# Patient Record
Sex: FEMALE | Race: WHITE | Employment: FULL TIME | URBAN - METROPOLITAN AREA
[De-identification: names, ages, dates, MRNs, and addresses within clinical notes are randomized per-mention and may not be internally consistent; named-entity substitution may affect disease eponyms.]

---

## 2023-08-01 SDOH — HEALTH STABILITY: PHYSICAL HEALTH: ON AVERAGE, HOW MANY MINUTES DO YOU ENGAGE IN EXERCISE AT THIS LEVEL?: 20 MIN

## 2023-08-01 SDOH — HEALTH STABILITY: PHYSICAL HEALTH: ON AVERAGE, HOW MANY DAYS PER WEEK DO YOU ENGAGE IN MODERATE TO STRENUOUS EXERCISE (LIKE A BRISK WALK)?: 7 DAYS

## 2023-08-01 ASSESSMENT — SOCIAL DETERMINANTS OF HEALTH (SDOH)

## 2023-08-04 ENCOUNTER — OFFICE VISIT (OUTPATIENT)
Age: 33
End: 2023-08-04
Payer: COMMERCIAL

## 2023-08-04 VITALS
SYSTOLIC BLOOD PRESSURE: 200 MMHG | HEART RATE: 116 BPM | WEIGHT: 188 LBS | OXYGEN SATURATION: 100 % | BODY MASS INDEX: 34.6 KG/M2 | DIASTOLIC BLOOD PRESSURE: 110 MMHG | TEMPERATURE: 99.3 F | HEIGHT: 62 IN | RESPIRATION RATE: 16 BRPM

## 2023-08-04 DIAGNOSIS — Z11.59 ENCOUNTER FOR HEPATITIS C SCREENING TEST FOR LOW RISK PATIENT: ICD-10-CM

## 2023-08-04 DIAGNOSIS — F41.1 GAD (GENERALIZED ANXIETY DISORDER): ICD-10-CM

## 2023-08-04 DIAGNOSIS — Z00.00 ROUTINE GENERAL MEDICAL EXAMINATION AT A HEALTH CARE FACILITY: Primary | ICD-10-CM

## 2023-08-04 DIAGNOSIS — R03.0 WHITE COAT SYNDROME WITHOUT DIAGNOSIS OF HYPERTENSION: ICD-10-CM

## 2023-08-04 DIAGNOSIS — Z13.1 SCREENING FOR DIABETES MELLITUS: ICD-10-CM

## 2023-08-04 DIAGNOSIS — Z11.3 SCREENING EXAMINATION FOR STD (SEXUALLY TRANSMITTED DISEASE): ICD-10-CM

## 2023-08-04 DIAGNOSIS — Z13.220 SCREENING, LIPID: ICD-10-CM

## 2023-08-04 PROCEDURE — 99385 PREV VISIT NEW AGE 18-39: CPT | Performed by: NURSE PRACTITIONER

## 2023-08-04 SDOH — ECONOMIC STABILITY: INCOME INSECURITY: HOW HARD IS IT FOR YOU TO PAY FOR THE VERY BASICS LIKE FOOD, HOUSING, MEDICAL CARE, AND HEATING?: NOT VERY HARD

## 2023-08-04 SDOH — ECONOMIC STABILITY: FOOD INSECURITY: WITHIN THE PAST 12 MONTHS, YOU WORRIED THAT YOUR FOOD WOULD RUN OUT BEFORE YOU GOT MONEY TO BUY MORE.: NEVER TRUE

## 2023-08-04 SDOH — ECONOMIC STABILITY: HOUSING INSECURITY
IN THE LAST 12 MONTHS, WAS THERE A TIME WHEN YOU DID NOT HAVE A STEADY PLACE TO SLEEP OR SLEPT IN A SHELTER (INCLUDING NOW)?: NO

## 2023-08-04 SDOH — ECONOMIC STABILITY: FOOD INSECURITY: WITHIN THE PAST 12 MONTHS, THE FOOD YOU BOUGHT JUST DIDN'T LAST AND YOU DIDN'T HAVE MONEY TO GET MORE.: NEVER TRUE

## 2023-08-04 ASSESSMENT — ENCOUNTER SYMPTOMS
ABDOMINAL PAIN: 0
CONSTIPATION: 0
SHORTNESS OF BREATH: 0
DIARRHEA: 0
COUGH: 0
BLOOD IN STOOL: 0
EYE PAIN: 0

## 2023-08-04 ASSESSMENT — PATIENT HEALTH QUESTIONNAIRE - PHQ9
SUM OF ALL RESPONSES TO PHQ QUESTIONS 1-9: 0
7. TROUBLE CONCENTRATING ON THINGS, SUCH AS READING THE NEWSPAPER OR WATCHING TELEVISION: 0
8. MOVING OR SPEAKING SO SLOWLY THAT OTHER PEOPLE COULD HAVE NOTICED. OR THE OPPOSITE, BEING SO FIGETY OR RESTLESS THAT YOU HAVE BEEN MOVING AROUND A LOT MORE THAN USUAL: 0
1. LITTLE INTEREST OR PLEASURE IN DOING THINGS: 0
SUM OF ALL RESPONSES TO PHQ QUESTIONS 1-9: 0
2. FEELING DOWN, DEPRESSED OR HOPELESS: 0
3. TROUBLE FALLING OR STAYING ASLEEP: 0
SUM OF ALL RESPONSES TO PHQ QUESTIONS 1-9: 0
10. IF YOU CHECKED OFF ANY PROBLEMS, HOW DIFFICULT HAVE THESE PROBLEMS MADE IT FOR YOU TO DO YOUR WORK, TAKE CARE OF THINGS AT HOME, OR GET ALONG WITH OTHER PEOPLE: 0
SUM OF ALL RESPONSES TO PHQ QUESTIONS 1-9: 0
SUM OF ALL RESPONSES TO PHQ9 QUESTIONS 1 & 2: 0
4. FEELING TIRED OR HAVING LITTLE ENERGY: 0
9. THOUGHTS THAT YOU WOULD BE BETTER OFF DEAD, OR OF HURTING YOURSELF: 0
5. POOR APPETITE OR OVEREATING: 0
6. FEELING BAD ABOUT YOURSELF - OR THAT YOU ARE A FAILURE OR HAVE LET YOURSELF OR YOUR FAMILY DOWN: 0

## 2023-08-04 NOTE — PROGRESS NOTES
Chief Complaint   Patient presents with    New Patient       1. Have you been to the ER, urgent care clinic since your last visit? Hospitalized since your last visit?       no    2. Have you seen or consulted any other health care providers outside of the [address] [place] [address] since your last visit? Include any pap smears or colon screening. no    3. For patients over 45: Has the patient had a colonoscopy? N/A     If the patient is female:    4. For patients over 40: Has the patient had a mammogram? N/A    5. For patients over 21: Has the patient had a pap smear? No      No flowsheet data found. Health Maintenance Due   Topic Date Due    COVID-19 Vaccine (1) Never done    Varicella vaccine (1 of 2 - 2-dose childhood series) Never done    Depression Screen  Never done    HIV screen  Never done    Hepatitis C screen  Never done    DTaP/Tdap/Td vaccine (1 - Tdap) Never done    Cervical cancer screen  05/10/2021    Flu vaccine (1) Never done       PHQ-9  8/4/2023   Little interest or pleasure in doing things 0   Feeling down, depressed, or hopeless 0   Trouble falling or staying asleep, or sleeping too much 0   Feeling tired or having little energy 0   Poor appetite or overeating 0   Feeling bad about yourself - or that you are a failure or have let yourself or your family down 0   Trouble concentrating on things, such as reading the newspaper or watching television 0   Moving or speaking so slowly that other people could have noticed.  Or the opposite - being so fidgety or restless that you have been moving around a lot more than usual 0   Thoughts that you would be better off dead, or of hurting yourself in some way 0   PHQ-2 Score 0   PHQ-9 Total Score 0   If you checked off any problems, how difficult have these problems made it for you to do your work, take care of things at home, or get along with other people? 0

## 2023-08-04 NOTE — PROGRESS NOTES
HPI:   Jaquan Borjas is a 34 y.o. female who presents to [facility]. She is returning to care after 10 years. She has significant anxiety in regards to healthcare. She has a history of anxiety. Family history of anxiety in mother and sister. GAD7=13    She is a triplet. She has a brother and sister. She had a normal pap smear in 2016. She is currently menstruating. She is sexually active with her long term boyfriend. They live together. She uses condoms for contraception. She does not wish to have children. She works for a [employer]. No current outpatient medications on file. No current facility-administered medications for this visit. No Known Allergies   Patient Active Problem List   Diagnosis    Female hirsutism    QUINN (generalized anxiety disorder)    White coat syndrome without diagnosis of hypertension     History reviewed. No pertinent past medical history. History reviewed. No pertinent surgical history. Patient's last menstrual period was 08/03/2023 (exact date).    Family History   Problem Relation Age of Onset    Anxiety Disorder Mother     Hypertension Mother     Anxiety Disorder Sister     Hypertension Maternal Grandmother     Hypertension Paternal Grandfather     Diabetes Paternal Grandfather       Social History     Socioeconomic History    Marital status: Single     Spouse name: Not on file    Number of children: Not on file    Years of education: Not on file    Highest education level: Not on file   Occupational History    Not on file   Tobacco Use    Smoking status: Never    Smokeless tobacco: Never   Substance and Sexual Activity    Alcohol use: Yes     Comment: 6 drinks a week    Drug use: Never    Sexual activity: Yes     Partners: Male     Birth control/protection: Condom     Comment: In a relationship   Other Topics Concern    Not on file   Social History Narrative    Not on file     Social Determinants of Health     Financial

## 2023-08-05 LAB
ALBUMIN SERPL-MCNC: 5 G/DL (ref 3.9–4.9)
ALBUMIN/CREAT UR: 75 MG/G CREAT (ref 0–29)
ALBUMIN/GLOB SERPL: 1.7 {RATIO} (ref 1.2–2.2)
ALP SERPL-CCNC: 85 IU/L (ref 44–121)
ALT SERPL-CCNC: 15 IU/L (ref 0–32)
AST SERPL-CCNC: 15 IU/L (ref 0–40)
BASOPHILS # BLD AUTO: 0.1 X10E3/UL (ref 0–0.2)
BASOPHILS NFR BLD AUTO: 1 %
BILIRUB SERPL-MCNC: 0.4 MG/DL (ref 0–1.2)
BUN SERPL-MCNC: 12 MG/DL (ref 6–20)
BUN/CREAT SERPL: 13 (ref 9–23)
CALCIUM SERPL-MCNC: 10 MG/DL (ref 8.7–10.2)
CHLORIDE SERPL-SCNC: 100 MMOL/L (ref 96–106)
CHOLEST SERPL-MCNC: 235 MG/DL (ref 100–199)
CO2 SERPL-SCNC: 21 MMOL/L (ref 20–29)
CREAT SERPL-MCNC: 0.94 MG/DL (ref 0.57–1)
CREAT UR-MCNC: 106.1 MG/DL
EGFRCR SERPLBLD CKD-EPI 2021: 82 ML/MIN/1.73
EOSINOPHIL # BLD AUTO: 0.2 X10E3/UL (ref 0–0.4)
EOSINOPHIL NFR BLD AUTO: 2 %
ERYTHROCYTE [DISTWIDTH] IN BLOOD BY AUTOMATED COUNT: 13 % (ref 11.7–15.4)
GLOBULIN SER CALC-MCNC: 2.9 G/DL (ref 1.5–4.5)
GLUCOSE SERPL-MCNC: 93 MG/DL (ref 70–99)
HBA1C MFR BLD: 5.5 % (ref 4.8–5.6)
HCT VFR BLD AUTO: 51 % (ref 34–46.6)
HCV AB SERPL QL IA: NORMAL
HCV IGG SERPL QL IA: NON REACTIVE
HDLC SERPL-MCNC: 54 MG/DL
HGB BLD-MCNC: 17.4 G/DL (ref 11.1–15.9)
HIV 1+2 AB+HIV1 P24 AG SERPL QL IA: NON REACTIVE
IMM GRANULOCYTES # BLD AUTO: 0 X10E3/UL (ref 0–0.1)
IMM GRANULOCYTES NFR BLD AUTO: 0 %
IMP & REVIEW OF LAB RESULTS: NORMAL
LDLC SERPL CALC-MCNC: 146 MG/DL (ref 0–99)
LYMPHOCYTES # BLD AUTO: 2.6 X10E3/UL (ref 0.7–3.1)
LYMPHOCYTES NFR BLD AUTO: 25 %
MCH RBC QN AUTO: 30 PG (ref 26.6–33)
MCHC RBC AUTO-ENTMCNC: 34.1 G/DL (ref 31.5–35.7)
MCV RBC AUTO: 88 FL (ref 79–97)
MICROALBUMIN UR-MCNC: 79.9 UG/ML
MONOCYTES # BLD AUTO: 0.4 X10E3/UL (ref 0.1–0.9)
MONOCYTES NFR BLD AUTO: 4 %
NEUTROPHILS # BLD AUTO: 7.1 X10E3/UL (ref 1.4–7)
NEUTROPHILS NFR BLD AUTO: 68 %
PLATELET # BLD AUTO: 317 X10E3/UL (ref 150–450)
POTASSIUM SERPL-SCNC: 4.2 MMOL/L (ref 3.5–5.2)
PROT SERPL-MCNC: 7.9 G/DL (ref 6–8.5)
RBC # BLD AUTO: 5.8 X10E6/UL (ref 3.77–5.28)
SODIUM SERPL-SCNC: 140 MMOL/L (ref 134–144)
TRIGL SERPL-MCNC: 193 MG/DL (ref 0–149)
TSH SERPL DL<=0.005 MIU/L-ACNC: 1.75 UIU/ML (ref 0.45–4.5)
VLDLC SERPL CALC-MCNC: 35 MG/DL (ref 5–40)
WBC # BLD AUTO: 10.3 X10E3/UL (ref 3.4–10.8)

## 2023-09-25 ENCOUNTER — OFFICE VISIT (OUTPATIENT)
Age: 33
End: 2023-09-25
Payer: COMMERCIAL

## 2023-09-25 DIAGNOSIS — F41.1 GAD (GENERALIZED ANXIETY DISORDER): Primary | ICD-10-CM

## 2023-09-25 PROCEDURE — 90791 PSYCH DIAGNOSTIC EVALUATION: CPT | Performed by: SOCIAL WORKER

## 2023-09-25 NOTE — PROGRESS NOTES
In Office-Initial Evaluation    Time Start:8:55 am  Time End:9:56 am    DX:    Diagnosis Orders   1. QUINN (generalized anxiety disorder)                 Met with Ms. Kavon Moss 9/25/2023 for our first appointment. This patient was referred by her NP due to anxious symptoms. She reports she has had anxiety for as long as she can remember. One of her side effects from her anxiety is she admits to scratching her arms-not really aware when she does it but often has scratches up and down her arms. Her partner is also an anxious person so he is not much of a support for her. Ms. Kavon Moss was in counseling when she was 14years old for anxiety but did not get any better from that assistance. Additionally, Ms. Kavon Moss reports both generalized and social anxiety. She does not like going into a store and asking for help, doesn't like new or different surroundings, avoids crowds and avoids using the phone as much as possible. Ms. Kavon Moss grew up in [address] [place] [address] and is a triplet. She has a sister and brother-both residing in [address] [place] [address]. Her mother and father  when she was young but still \"get along okay. \"  Her parents are in their 63's and her mother and one of the sisters live together in [facility] and her father resides in [place]. She communicates with all her family but they do not seem to be a particularly close family. The sister has alcohol issues and has difficulty holding down a job, per this patient. Ms. Kavon Moss and her brother both attended college. This patient graduated from [employer]. She has worked in a [employer] [place], as an  for the past 5 years. The patient has been with her boyfriend for the past 10 years. They live together. He has a part time job and does not seem to contribute much financially to the relationship. Ms. Kavon Moss may benefit from medication due to the severity of her anxiety.   She reports over thinking, difficulty sleeping and only really relaxes

## 2023-10-13 ENCOUNTER — OFFICE VISIT (OUTPATIENT)
Age: 33
End: 2023-10-13
Payer: COMMERCIAL

## 2023-10-13 VITALS
HEART RATE: 91 BPM | BODY MASS INDEX: 35.41 KG/M2 | OXYGEN SATURATION: 100 % | SYSTOLIC BLOOD PRESSURE: 194 MMHG | DIASTOLIC BLOOD PRESSURE: 100 MMHG | HEIGHT: 62 IN | RESPIRATION RATE: 16 BRPM | TEMPERATURE: 99.5 F | WEIGHT: 192.4 LBS

## 2023-10-13 DIAGNOSIS — Z12.4 ENCOUNTER FOR PAPANICOLAOU SMEAR OF CERVIX: ICD-10-CM

## 2023-10-13 DIAGNOSIS — F41.1 GAD (GENERALIZED ANXIETY DISORDER): ICD-10-CM

## 2023-10-13 DIAGNOSIS — I10 ESSENTIAL HYPERTENSION: Primary | ICD-10-CM

## 2023-10-13 PROCEDURE — 3077F SYST BP >= 140 MM HG: CPT | Performed by: NURSE PRACTITIONER

## 2023-10-13 PROCEDURE — 99214 OFFICE O/P EST MOD 30 MIN: CPT | Performed by: NURSE PRACTITIONER

## 2023-10-13 PROCEDURE — 3080F DIAST BP >= 90 MM HG: CPT | Performed by: NURSE PRACTITIONER

## 2023-10-13 RX ORDER — ESCITALOPRAM OXALATE 5 MG/1
5 TABLET ORAL DAILY
Qty: 30 TABLET | Refills: 3 | Status: SHIPPED | OUTPATIENT
Start: 2023-10-13

## 2023-10-13 RX ORDER — LISINOPRIL 2.5 MG/1
2.5 TABLET ORAL DAILY
Qty: 30 TABLET | Refills: 3 | Status: SHIPPED | OUTPATIENT
Start: 2023-10-13

## 2023-10-13 ASSESSMENT — ENCOUNTER SYMPTOMS: SHORTNESS OF BREATH: 0

## 2023-10-13 NOTE — PROGRESS NOTES
Assessment/Plan:     1. Essential hypertension  -     lisinopril (PRINIVIL;ZESTRIL) 2.5 MG tablet; Take 1 tablet by mouth daily, Disp-30 tablet, R-3Normal  Uncontrolled. Initiate treatment as above. Follow up in 4 weeks. 2. QUINN (generalized anxiety disorder)  -     escitalopram (LEXAPRO) 5 MG tablet; Take 1 tablet by mouth daily, Disp-30 tablet, R-3Normal  Uncontrolled. Initiate treatment as above. Follow up in 4 weeks. Continue with counseling services. 3. Encounter for Papanicolaou smear of cervix  -     PAP IG, Aptima HPV and rfx 16/18,45 ([id number]); Future       Return in about 4 weeks (around 11/10/2023) for Follow Up. Discussed expected course/resolution/complications of diagnosis in detail with patient. Medication risks/benefits/costs/interactions/alternatives discussed with patient. Pt was given after visit summary which includes diagnoses, current medications & vitals. Pt expressed understanding with the diagnosis and plan          Subjective:      Rome Alex is a 34 y.o. female who presents for had concerns including Gynecologic Exam.     Home pressures have been still high in the 170s/120s. She is asymptomatic. She is overdue for pap smear. Longstanding history of anxiety. Seeing counselor now who suggested medication management and she is interested in this. Patient Active Problem List   Diagnosis    Female hirsutism    QUINN (generalized anxiety disorder)    White coat syndrome without diagnosis of hypertension       Current Outpatient Medications   Medication Sig Dispense Refill    lisinopril (PRINIVIL;ZESTRIL) 2.5 MG tablet Take 1 tablet by mouth daily 30 tablet 3    escitalopram (LEXAPRO) 5 MG tablet Take 1 tablet by mouth daily 30 tablet 3     No current facility-administered medications for this visit. No Known Allergies    ROS:   Review of Systems   Constitutional:  Negative for fatigue. Respiratory:  Negative for shortness of breath.     Cardiovascular:

## 2023-10-18 ENCOUNTER — OFFICE VISIT (OUTPATIENT)
Age: 33
End: 2023-10-18
Payer: COMMERCIAL

## 2023-10-18 DIAGNOSIS — F41.1 GAD (GENERALIZED ANXIETY DISORDER): Primary | ICD-10-CM

## 2023-10-18 PROCEDURE — 90834 PSYTX W PT 45 MINUTES: CPT | Performed by: SOCIAL WORKER

## 2023-10-18 NOTE — PROGRESS NOTES
In office -Follow Up    Time Start:3:00 pm  Time End:3:47 pm    DX:    Diagnosis Orders   1. QUINN (generalized anxiety disorder)             Met with Ms. Billy Vazquez today for our follow up appt. This patient reports she has started taking Lexapro, 5 mg. And the only side effect she's experienced is feeling a little more tired. She just started this medication last week and will see her NP for follow up on Nov. 13 for further evaluation. Ms. Billy Vazquez reported she has been \"working on her homework\" which was to practice eye contact with people and practice talking to them. She has asked several grocery store assistants where foods are in the store. She has not been able to make any cold calls on the phone yet, stating she hasn't gotten up enough courage yet for that task. The patient continues to be nervous during our appts. She wrings her hands, talks rapidly and feels awkward initiating the conversation. She was given another homework assignment for our next appt which is to list areas in her life that are effected by her anxiety and we will discuss ways to come up with coping skills. No acute symptoms reported. We did set a follow-up appointment with this clinician.       Follow-up appt date (if applicable) is:  Novemeber 13 @ 11 in office (after her appt with her NP)    Cora Castillo LCSW

## 2023-10-20 LAB
CYTOLOGIST CVX/VAG CYTO: NORMAL
CYTOLOGY CVX/VAG DOC CYTO: NORMAL
CYTOLOGY CVX/VAG DOC THIN PREP: NORMAL
DX ICD CODE: NORMAL
HPV GENOTYPE REFLEX: NORMAL
HPV I/H RISK 4 DNA CVX QL PROBE+SIG AMP: NEGATIVE
Lab: NORMAL
OTHER STN SPEC: NORMAL
STAT OF ADQ CVX/VAG CYTO-IMP: NORMAL

## 2023-11-13 ENCOUNTER — OFFICE VISIT (OUTPATIENT)
Age: 33
End: 2023-11-13
Payer: COMMERCIAL

## 2023-11-13 VITALS
DIASTOLIC BLOOD PRESSURE: 106 MMHG | HEART RATE: 88 BPM | BODY MASS INDEX: 35.22 KG/M2 | WEIGHT: 191.4 LBS | OXYGEN SATURATION: 100 % | RESPIRATION RATE: 16 BRPM | HEIGHT: 62 IN | SYSTOLIC BLOOD PRESSURE: 171 MMHG | TEMPERATURE: 99.2 F

## 2023-11-13 DIAGNOSIS — I10 ESSENTIAL HYPERTENSION: Primary | ICD-10-CM

## 2023-11-13 DIAGNOSIS — F41.1 GAD (GENERALIZED ANXIETY DISORDER): ICD-10-CM

## 2023-11-13 DIAGNOSIS — L30.9 DERMATITIS: ICD-10-CM

## 2023-11-13 DIAGNOSIS — R79.89 ABNORMAL CBC: ICD-10-CM

## 2023-11-13 DIAGNOSIS — F41.1 GAD (GENERALIZED ANXIETY DISORDER): Primary | ICD-10-CM

## 2023-11-13 PROBLEM — R03.0 WHITE COAT SYNDROME WITHOUT DIAGNOSIS OF HYPERTENSION: Status: RESOLVED | Noted: 2023-08-04 | Resolved: 2023-11-13

## 2023-11-13 PROCEDURE — 3077F SYST BP >= 140 MM HG: CPT | Performed by: NURSE PRACTITIONER

## 2023-11-13 PROCEDURE — 99214 OFFICE O/P EST MOD 30 MIN: CPT | Performed by: NURSE PRACTITIONER

## 2023-11-13 PROCEDURE — 3080F DIAST BP >= 90 MM HG: CPT | Performed by: NURSE PRACTITIONER

## 2023-11-13 PROCEDURE — 90834 PSYTX W PT 45 MINUTES: CPT | Performed by: SOCIAL WORKER

## 2023-11-13 RX ORDER — LISINOPRIL 5 MG/1
5 TABLET ORAL DAILY
Qty: 30 TABLET | Refills: 3 | Status: SHIPPED | OUTPATIENT
Start: 2023-11-13

## 2023-11-13 RX ORDER — ESCITALOPRAM OXALATE 10 MG/1
10 TABLET ORAL DAILY
Qty: 30 TABLET | Refills: 3 | Status: SHIPPED | OUTPATIENT
Start: 2023-11-13

## 2023-11-13 ASSESSMENT — ENCOUNTER SYMPTOMS: SHORTNESS OF BREATH: 0

## 2023-11-13 NOTE — PROGRESS NOTES
In office -Follow Up    Time Start:11:00 am  Time End:11:40 am    DX:    Diagnosis Orders   1. QUINN (generalized anxiety disorder)             Met with Ms. Janet Smith today for our follow up appt. This patient reports she has tried calling people to reduce her anxiety on the phone. She tried this exercise a few times but stated it made her really nervous if the other person asked her questions she didn't know how to answer. Ms. Janet Smith states that her mother is moving to [place] and she has taken over her mother's cat. With her \"disabled\" cat (deaf and blind) and the old cat of her mother's, she states she isn't sure they will become friends. She is worried that she may not be able to keep her promise to her mother to care for her cat. This makes her nervous. She and her boyfriend have plans for Thanksgiving with her brother. She seems like she is doing a bit better but still presents with significant anxiety. No acute symptoms reported. We did set a follow-up appointment with this clinician.       Follow-up appt date (if applicable) is:  Dec. 11 @ 9 in office    Dheeraj Reynolds LCSW

## 2023-11-13 NOTE — PROGRESS NOTES
Assessment/Plan:     1. Essential hypertension  -     lisinopril (PRINIVIL;ZESTRIL) 5 MG tablet; Take 1 tablet by mouth daily, Disp-30 tablet, R-3Normal  Uncontrolled. Increase lisinopril to 5 mg and follow-up in 1 month. 2. QUINN (generalized anxiety disorder)  -     escitalopram (LEXAPRO) 10 MG tablet; Take 1 tablet by mouth daily, Disp-30 tablet, R-3Normal  Improving. Increase Lexapro to 10 mg. Continue follow-up with counseling as directed. 3. Abnormal CBC  -     CBC with Auto Differential; Future  Repeat CBC today. 4. Dermatitis  Appears to be more contact related. We will keep an eye and she will let me know if symptoms worsen. Return in about 4 weeks (around 12/11/2023) for Follow Up. Discussed expected course/resolution/complications of diagnosis in detail with patient. Medication risks/benefits/costs/interactions/alternatives discussed with patient. Pt was given after visit summary which includes diagnoses, current medications & vitals. Pt expressed understanding with the diagnosis and plan          Subjective:      Aileen Wu is a 34 y.o. female who presents for had concerns including 1 Month Follow-Up. Cardiovascular Review:  The patient has hypertension, hyperlipidemia, and obesity. Diet and Lifestyle: not attempting to follow a low fat, low cholesterol diet, sedentary  Home BP Monitoring: is not well controlled at home, ranging 150's/80's. Pertinent ROS: taking medications as instructed, no medication side effects noted, no TIA's, no chest pain on exertion, no dyspnea on exertion, no swelling of ankles. Here for follow-up of generalized anxiety disorder. She is currently in counseling in house. She has started Lexapro and is tolerating without difficulty. She reports an improvement in her mood. She also reports continued anxiety symptoms.     She has noted an occasional pruritic rash on her arms and legs which last a brief duration then resolved without

## 2023-11-14 LAB
BASOPHILS # BLD AUTO: 0.1 X10E3/UL (ref 0–0.2)
BASOPHILS NFR BLD AUTO: 1 %
EOSINOPHIL # BLD AUTO: 0.2 X10E3/UL (ref 0–0.4)
EOSINOPHIL NFR BLD AUTO: 3 %
ERYTHROCYTE [DISTWIDTH] IN BLOOD BY AUTOMATED COUNT: 12.9 % (ref 11.7–15.4)
HCT VFR BLD AUTO: 46 % (ref 34–46.6)
HGB BLD-MCNC: 15.7 G/DL (ref 11.1–15.9)
IMM GRANULOCYTES # BLD AUTO: 0 X10E3/UL (ref 0–0.1)
IMM GRANULOCYTES NFR BLD AUTO: 0 %
LYMPHOCYTES # BLD AUTO: 2.8 X10E3/UL (ref 0.7–3.1)
LYMPHOCYTES NFR BLD AUTO: 36 %
MCH RBC QN AUTO: 30.3 PG (ref 26.6–33)
MCHC RBC AUTO-ENTMCNC: 34.1 G/DL (ref 31.5–35.7)
MCV RBC AUTO: 89 FL (ref 79–97)
MONOCYTES # BLD AUTO: 0.4 X10E3/UL (ref 0.1–0.9)
MONOCYTES NFR BLD AUTO: 5 %
NEUTROPHILS # BLD AUTO: 4.2 X10E3/UL (ref 1.4–7)
NEUTROPHILS NFR BLD AUTO: 55 %
PLATELET # BLD AUTO: 246 X10E3/UL (ref 150–450)
RBC # BLD AUTO: 5.18 X10E6/UL (ref 3.77–5.28)
WBC # BLD AUTO: 7.8 X10E3/UL (ref 3.4–10.8)

## 2023-12-04 ENCOUNTER — TELEPHONE (OUTPATIENT)
Age: 33
End: 2023-12-04

## 2023-12-12 ENCOUNTER — OFFICE VISIT (OUTPATIENT)
Age: 33
End: 2023-12-12
Payer: COMMERCIAL

## 2023-12-12 VITALS
SYSTOLIC BLOOD PRESSURE: 170 MMHG | WEIGHT: 195 LBS | RESPIRATION RATE: 16 BRPM | TEMPERATURE: 98.7 F | DIASTOLIC BLOOD PRESSURE: 110 MMHG | HEART RATE: 86 BPM | BODY MASS INDEX: 35.88 KG/M2 | OXYGEN SATURATION: 99 % | HEIGHT: 62 IN

## 2023-12-12 DIAGNOSIS — I10 ESSENTIAL HYPERTENSION: Primary | ICD-10-CM

## 2023-12-12 PROCEDURE — 3080F DIAST BP >= 90 MM HG: CPT | Performed by: NURSE PRACTITIONER

## 2023-12-12 PROCEDURE — 99213 OFFICE O/P EST LOW 20 MIN: CPT | Performed by: NURSE PRACTITIONER

## 2023-12-12 PROCEDURE — 3077F SYST BP >= 140 MM HG: CPT | Performed by: NURSE PRACTITIONER

## 2023-12-12 RX ORDER — LISINOPRIL 10 MG/1
10 TABLET ORAL DAILY
Qty: 30 TABLET | Refills: 3 | Status: SHIPPED | OUTPATIENT
Start: 2023-12-12

## 2023-12-12 ASSESSMENT — PATIENT HEALTH QUESTIONNAIRE - PHQ9
1. LITTLE INTEREST OR PLEASURE IN DOING THINGS: 0
SUM OF ALL RESPONSES TO PHQ QUESTIONS 1-9: 0
2. FEELING DOWN, DEPRESSED OR HOPELESS: 0
SUM OF ALL RESPONSES TO PHQ9 QUESTIONS 1 & 2: 0
SUM OF ALL RESPONSES TO PHQ QUESTIONS 1-9: 0

## 2023-12-12 ASSESSMENT — ENCOUNTER SYMPTOMS: SHORTNESS OF BREATH: 0

## 2023-12-12 NOTE — PROGRESS NOTES
Assessment/Plan:     1. Essential hypertension  -     lisinopril (PRINIVIL;ZESTRIL) 10 MG tablet; Take 1 tablet by mouth daily, Disp-30 tablet, R-3Normal   Improving. Home pressures have reached 140-150. Increase as above. She will follow up in two months. She will call in one month with home pressures. Return in about 2 months (around 2/12/2024) for Follow Up. Discussed expected course/resolution/complications of diagnosis in detail with patient. Medication risks/benefits/costs/interactions/alternatives discussed with patient. Pt was given after visit summary which includes diagnoses, current medications & vitals. Pt expressed understanding with the diagnosis and plan          Subjective:      Emely Cruz is a 34 y.o. female who presents for had concerns including Hypertension (Follow up) and Medication Check (Follow up). Cardiovascular Review:  The patient has hypertension, hyperlipidemia, and obesity. Diet and Lifestyle: not attempting to follow a low fat, low cholesterol diet, sedentary  Home BP Monitoring: is not well controlled at home, ranging 140's/80's. Pertinent ROS: taking medications as instructed, no medication side effects noted, no TIA's, no chest pain on exertion, no dyspnea on exertion, no swelling of ankles. Here for follow-up of generalized anxiety disorder. She is currently in counseling in house. She has started Lexapro and is tolerating without difficulty. She reports an improvement in her mood. She reports she is stable in her mood at this time.      Patient Active Problem List   Diagnosis    Female hirsutism    Essential hypertension    QUINN (generalized anxiety disorder)       Current Outpatient Medications   Medication Sig Dispense Refill    lisinopril (PRINIVIL;ZESTRIL) 10 MG tablet Take 1 tablet by mouth daily 30 tablet 3    escitalopram (LEXAPRO) 10 MG tablet Take 1 tablet by mouth daily 30 tablet 3     No current facility-administered medications for

## 2023-12-27 ENCOUNTER — OFFICE VISIT (OUTPATIENT)
Age: 33
End: 2023-12-27
Payer: COMMERCIAL

## 2023-12-27 DIAGNOSIS — F41.1 GAD (GENERALIZED ANXIETY DISORDER): Primary | ICD-10-CM

## 2023-12-27 PROCEDURE — 90834 PSYTX W PT 45 MINUTES: CPT | Performed by: SOCIAL WORKER

## 2023-12-27 NOTE — PROGRESS NOTES
In office -Follow Up    Time Start:3:06 pm  Time End:3:52 pm    DX:    Diagnosis Orders   1. QUINN (generalized anxiety disorder)             Met with Ms. Aicha La today for our follow up appt. This patient is celebrating her birthday today. She is off work and enjoying her holiday and birthday with several days off work. Her [place] sounds good in that she enjoyed her family and several friends. On New Year's she will be with her brother and his 3year old son for a birthday celebration. She and boyfriend continue to sound stable and he is supportive to her. The patient has been practicing \"eye contact\" with others and acknowledges that she has to really focus her attention to look at people directly versus averting their eyes. We discussed what kind of message it gives others when they are not looked at directly as well as what kind of \"vibe\" she gives off when not looking at others. She is going home today to ask her boyfriend if he has noticed her change in addressing him. She feels like things are going well with no acute symptoms reported today. We did set a follow-up appointment with this clinician.       Follow-up appt date (if applicable) is:  Jan. 29 @ 9 in office    Marybeth Lee LCSW

## 2024-01-29 ENCOUNTER — OFFICE VISIT (OUTPATIENT)
Age: 34
End: 2024-01-29
Payer: COMMERCIAL

## 2024-01-29 DIAGNOSIS — F41.1 GAD (GENERALIZED ANXIETY DISORDER): Primary | ICD-10-CM

## 2024-01-29 PROCEDURE — 90834 PSYTX W PT 45 MINUTES: CPT | Performed by: SOCIAL WORKER

## 2024-01-29 NOTE — PROGRESS NOTES
In office -Follow Up    Time Start:9:00 am  Time End:9:48 am    DX:    Diagnosis Orders   1. QUINN (generalized anxiety disorder)             Met with Ms. Negrete today for our follow up appt. This patient reports she has been working on increasing her communication with others.  Usually she will send emails for her work communication but she has been using the phone to connect to people.  She stated she was surprised as to how many people have thanked her for the phone call versus the impersonal emails.  She admits she doesn't do \"small talk\" but she is going to try and incorporate a bit of that into the conversation to get more comfortable. Pt has been worried about her blind cat because she had to recently have surgery.  She has taken today and tomorrow off.  She worries about the amount of work she will have upon her return to work.  She is doing somewhat better with her anxiety but still struggles.  As her partner has anxiety also, he doesn't help with her issues.    We did set a follow-up appointment with this clinician.      Follow-up appt date (if applicable) is:  feb. 26 @ 9 in office.    Michell Ortiz LCSW

## 2024-02-07 DIAGNOSIS — F41.1 GAD (GENERALIZED ANXIETY DISORDER): ICD-10-CM

## 2024-02-07 DIAGNOSIS — I10 ESSENTIAL HYPERTENSION: ICD-10-CM

## 2024-02-08 RX ORDER — ESCITALOPRAM OXALATE 5 MG/1
5 TABLET ORAL DAILY
Qty: 30 TABLET | Refills: 5 | OUTPATIENT
Start: 2024-02-08

## 2024-02-08 RX ORDER — LISINOPRIL 2.5 MG/1
2.5 TABLET ORAL DAILY
Qty: 30 TABLET | Refills: 3 | OUTPATIENT
Start: 2024-02-08

## 2024-02-08 NOTE — TELEPHONE ENCOUNTER
Patient has follow-up appt 2/12/24.      Lisinopril 2.5mg requested was d/c'd and changed to 10mg on 12/12/23.  Refused 2.5mg rx.  Ginger Jones    Last appointment: 12/12/23 Samira  Next appointment: 2/12/24 Samira  Previous refill encounter(s): escitalopram 11/13/23 30 + 3    Requested Prescriptions     Pending Prescriptions Disp Refills    escitalopram (LEXAPRO) 5 MG tablet [Pharmacy Med Name: ESCITALOPRAM 5MG TABLETS] 30 tablet 5     Sig: TAKE 1 TABLET BY MOUTH DAILY     Refused Prescriptions Disp Refills    lisinopril (PRINIVIL;ZESTRIL) 2.5 MG tablet [Pharmacy Med Name: LISINOPRIL 2.5MG TABLETS] 30 tablet 3     Sig: Take 1 tablet by mouth daily       For Pharmacy Admin Tracking Only    Program: Medication Refill  CPA in place:    Recommendation Provided To:   Intervention Detail: Discontinued Rx: 1, reason: Therapy Complete and New Rx: 1, reason: Patient Preference  Intervention Accepted By:   Gap Closed?:    Time Spent (min): 5

## 2024-02-12 ENCOUNTER — OFFICE VISIT (OUTPATIENT)
Age: 34
End: 2024-02-12
Payer: COMMERCIAL

## 2024-02-12 VITALS
RESPIRATION RATE: 16 BRPM | DIASTOLIC BLOOD PRESSURE: 98 MMHG | TEMPERATURE: 98 F | HEIGHT: 62 IN | SYSTOLIC BLOOD PRESSURE: 149 MMHG | HEART RATE: 78 BPM | BODY MASS INDEX: 36.8 KG/M2 | WEIGHT: 200 LBS | OXYGEN SATURATION: 99 %

## 2024-02-12 DIAGNOSIS — I10 ESSENTIAL HYPERTENSION: ICD-10-CM

## 2024-02-12 PROCEDURE — 3077F SYST BP >= 140 MM HG: CPT | Performed by: NURSE PRACTITIONER

## 2024-02-12 PROCEDURE — 99213 OFFICE O/P EST LOW 20 MIN: CPT | Performed by: NURSE PRACTITIONER

## 2024-02-12 PROCEDURE — 3080F DIAST BP >= 90 MM HG: CPT | Performed by: NURSE PRACTITIONER

## 2024-02-12 RX ORDER — LISINOPRIL 20 MG/1
20 TABLET ORAL DAILY
Qty: 90 TABLET | Refills: 1 | Status: SHIPPED | OUTPATIENT
Start: 2024-02-12

## 2024-02-12 NOTE — PROGRESS NOTES
Chief Complaint   Patient presents with    Follow-up     \"Have you been to the ER, urgent care clinic since your last visit?  Hospitalized since your last visit?\"    NO    “Have you seen or consulted any other health care providers outside of [facility] since your last visit?”    NO

## 2024-02-12 NOTE — PROGRESS NOTES
Assessment/Plan:     1. Essential hypertension  -     lisinopril (PRINIVIL;ZESTRIL) 20 MG tablet; Take 1 tablet by mouth daily, Disp-90 tablet, R-1Normal   Not to goal.  Increase as above.  Follow up in 4 weeks or sooner as needed.     Return in about 4 weeks (around 3/11/2024) for Follow Up.     Discussed expected course/resolution/complications of diagnosis in detail with patient.    Medication risks/benefits/costs/interactions/alternatives discussed with patient.    Pt was given after visit summary which includes diagnoses, current medications & vitals.   Pt expressed understanding with the diagnosis and plan          Subjective:      Thania Negrete is a 33 y.o. female who presents for had concerns including Follow-up.     Cardiovascular Review:  The patient has hypertension, hyperlipidemia, and obesity.  Diet and Lifestyle: not attempting to follow a low fat, low cholesterol diet, sedentary  Home BP Monitoring: is not well controlled at home, ranging 140-170's/80's.  Pertinent ROS: taking medications as instructed, no medication side effects noted, no TIA's, no chest pain on exertion, no dyspnea on exertion, no swelling of ankles.     Patient Active Problem List   Diagnosis    Female hirsutism    Essential hypertension    QUINN (generalized anxiety disorder)       Current Outpatient Medications   Medication Sig Dispense Refill    lisinopril (PRINIVIL;ZESTRIL) 20 MG tablet Take 1 tablet by mouth daily 90 tablet 1    escitalopram (LEXAPRO) 10 MG tablet Take 1 tablet by mouth daily 30 tablet 3     No current facility-administered medications for this visit.       No Known Allergies    ROS:   Review of Systems   Constitutional:  Negative for fatigue.   Respiratory:  Negative for shortness of breath.    Cardiovascular:  Negative for chest pain and leg swelling.         Objective:   BP (!) 149/98   Pulse 78   Temp 98 °F (36.7 °C)   Resp 16   Ht 1.575 m (5' 2\")   Wt 90.7 kg (200 lb)   LMP 01/13/2024 (Exact Date)

## 2024-02-26 ENCOUNTER — TELEPHONE (OUTPATIENT)
Age: 34
End: 2024-02-26

## 2024-02-26 NOTE — TELEPHONE ENCOUNTER
----- Message from Elizabeth Carey sent at 2/23/2024  1:58 PM EST -----  Subject: Message to Provider    QUESTIONS  Information for Provider? Patient called to cancel her appointment with   Michell Ortiz the . Please call and confirm with her that the   appointment was canceled.  ---------------------------------------------------------------------------  --------------  CALL BACK INFO  [phone]; OK to leave message on voicemail  ---------------------------------------------------------------------------  --------------  SCRIPT ANSWERS  Relationship to Patient? Self

## 2024-02-26 NOTE — TELEPHONE ENCOUNTER
Left VM for pt to reschedule appt she has with Michell Ortiz for 2/26/24. Pt called [facility] back on 2/23/24 and wanted to reschedule.-TM 2/26/24

## 2024-03-13 DIAGNOSIS — F41.1 GAD (GENERALIZED ANXIETY DISORDER): ICD-10-CM

## 2024-03-13 NOTE — TELEPHONE ENCOUNTER
PCP: Adrianne Hogan APRN - NP    Last appt: 2/12/2024     Future Appointments   Date Time Provider Department Center   3/15/2024  9:15 AM Adrianne Hogan APRN - NP [facility]       Requested Prescriptions     Pending Prescriptions Disp Refills    escitalopram (LEXAPRO) 10 MG tablet [Pharmacy Med Name: ESCITALOPRAM 10MG TABLETS] 30 tablet 3     Sig: TAKE 1 TABLET BY MOUTH DAILY       Prior labs and Blood pressures:  BP Readings from Last 3 Encounters:   02/12/24 (!) 149/98   12/12/23 (!) 170/110   11/13/23 (!) 171/106     Lab Results   Component Value Date/Time     08/04/2023 12:00 AM    K 4.2 08/04/2023 12:00 AM     08/04/2023 12:00 AM    CO2 21 08/04/2023 12:00 AM    BUN 12 08/04/2023 12:00 AM     No results found for: \"HBA1C\", \"QPY6GSOH\"  Lab Results   Component Value Date/Time    CHOL 235 08/04/2023 12:00 AM    HDL 54 08/04/2023 12:00 AM     No results found for: \"VITD3\", \"VD3RIA\"    Lab Results   Component Value Date/Time    TSH 1.750 08/04/2023 12:00 AM

## 2024-03-14 RX ORDER — ESCITALOPRAM OXALATE 10 MG/1
10 TABLET ORAL DAILY
Qty: 30 TABLET | Refills: 3 | Status: SHIPPED | OUTPATIENT
Start: 2024-03-14

## 2024-03-15 ENCOUNTER — OFFICE VISIT (OUTPATIENT)
Age: 34
End: 2024-03-15
Payer: COMMERCIAL

## 2024-03-15 VITALS
WEIGHT: 199.2 LBS | OXYGEN SATURATION: 100 % | BODY MASS INDEX: 36.66 KG/M2 | HEART RATE: 74 BPM | SYSTOLIC BLOOD PRESSURE: 147 MMHG | DIASTOLIC BLOOD PRESSURE: 96 MMHG | TEMPERATURE: 97.7 F | RESPIRATION RATE: 16 BRPM | HEIGHT: 62 IN

## 2024-03-15 DIAGNOSIS — I10 ESSENTIAL HYPERTENSION: Primary | ICD-10-CM

## 2024-03-15 PROCEDURE — 99213 OFFICE O/P EST LOW 20 MIN: CPT | Performed by: NURSE PRACTITIONER

## 2024-03-15 PROCEDURE — 3077F SYST BP >= 140 MM HG: CPT | Performed by: NURSE PRACTITIONER

## 2024-03-15 PROCEDURE — 3080F DIAST BP >= 90 MM HG: CPT | Performed by: NURSE PRACTITIONER

## 2024-03-15 RX ORDER — LISINOPRIL 40 MG/1
40 TABLET ORAL DAILY
Qty: 90 TABLET | Refills: 3 | Status: SHIPPED | OUTPATIENT
Start: 2024-03-15

## 2024-03-15 RX ORDER — LISINOPRIL 10 MG/1
TABLET ORAL
COMMUNITY
Start: 2024-03-12 | End: 2024-03-15 | Stop reason: ALTCHOICE

## 2024-03-15 NOTE — PROGRESS NOTES
Assessment/Plan:     1. Essential hypertension  -     lisinopril (PRINIVIL;ZESTRIL) 40 MG tablet; Take 1 tablet by mouth daily, Disp-90 tablet, R-3Normal  Poor control.  Increase as above.  Follow-up in 4 weeks or sooner as needed.    Return in about 4 weeks (around 4/12/2024) for Follow Up.     Discussed expected course/resolution/complications of diagnosis in detail with patient.    Medication risks/benefits/costs/interactions/alternatives discussed with patient.    Pt was given after visit summary which includes diagnoses, current medications & vitals.   Pt expressed understanding with the diagnosis and plan          Subjective:      Thania Negrete is a 33 y.o. female who presents for had concerns including Follow-up.     Cardiovascular Review:  The patient has hypertension, hyperlipidemia, and obesity.  Diet and Lifestyle: not attempting to follow a low fat, low cholesterol diet, sedentary  Home BP Monitoring: is not well controlled at home, ranging 116-140's/80's-90s.  Pertinent ROS: taking medications as instructed, no medication side effects noted, no TIA's, no chest pain on exertion, no dyspnea on exertion, no swelling of ankles.     Patient Active Problem List   Diagnosis    Female hirsutism    Essential hypertension    QUINN (generalized anxiety disorder)       Current Outpatient Medications   Medication Sig Dispense Refill    lisinopril (PRINIVIL;ZESTRIL) 40 MG tablet Take 1 tablet by mouth daily 90 tablet 3    escitalopram (LEXAPRO) 10 MG tablet TAKE 1 TABLET BY MOUTH DAILY 30 tablet 3     No current facility-administered medications for this visit.       No Known Allergies    ROS:   Review of Systems   Constitutional:  Negative for fatigue.   Respiratory:  Negative for shortness of breath.    Cardiovascular:  Negative for chest pain and leg swelling.         Objective:   BP (!) 147/96 (Site: Left Upper Arm, Position: Sitting, Cuff Size: Large Adult)   Pulse 74   Temp 97.7 °F (36.5 °C)   Resp 16   Ht

## 2024-03-15 NOTE — PROGRESS NOTES
Chief Complaint   Patient presents with    Follow-up     \"Have you been to the ER, urgent care clinic since your last visit?  Hospitalized since your last visit?\"    NO    “Have you seen or consulted any other health care providers outside of [facility] since your last visit?”    NO

## 2024-03-25 ASSESSMENT — ENCOUNTER SYMPTOMS: SHORTNESS OF BREATH: 0

## 2024-03-29 ENCOUNTER — PATIENT MESSAGE (OUTPATIENT)
Age: 34
End: 2024-03-29

## 2024-04-10 RX ORDER — AMLODIPINE BESYLATE 5 MG/1
5 TABLET ORAL DAILY
Qty: 30 TABLET | Refills: 3 | Status: SHIPPED | OUTPATIENT
Start: 2024-04-10

## 2024-04-10 NOTE — TELEPHONE ENCOUNTER
From: Thania Nergete  To: Adrianne Hogan  Sent: 3/29/2024 11:34 AM EDT  Subject: Blood Pressure Update    Per our discussion during my last visit, here are my most recent blood pressure readings since 2 weeks after we have increased the medication:    130 over 92

## 2024-04-11 DIAGNOSIS — I10 ESSENTIAL HYPERTENSION: ICD-10-CM

## 2024-04-11 RX ORDER — LISINOPRIL 10 MG/1
10 TABLET ORAL DAILY
Qty: 30 TABLET | Refills: 3 | OUTPATIENT
Start: 2024-04-11

## 2024-04-11 NOTE — TELEPHONE ENCOUNTER
PCP: Adrianne Hogan APRN - NP    Last appt: 3/15/2024   Future Appointments   Date Time Provider Department Center   4/19/2024 11:15 AM Adrianne Hogan APRN - NP [facility]       Requested Prescriptions     Pending Prescriptions Disp Refills    lisinopril (PRINIVIL;ZESTRIL) 10 MG tablet [Pharmacy Med Name: LISINOPRIL 10MG TABLETS] 30 tablet 3     Sig: TAKE 1 TABLET BY MOUTH DAILY         Prior labs and Blood pressures:  BP Readings from Last 3 Encounters:   03/15/24 (!) 147/96   02/12/24 (!) 149/98   12/12/23 (!) 170/110     Lab Results   Component Value Date/Time     08/04/2023 12:00 AM    K 4.2 08/04/2023 12:00 AM     08/04/2023 12:00 AM    CO2 21 08/04/2023 12:00 AM    BUN 12 08/04/2023 12:00 AM     No results found for: \"HBA1C\", \"[id number]\"  Lab Results   Component Value Date/Time    CHOL 235 08/04/2023 12:00 AM    HDL 54 08/04/2023 12:00 AM     No results found for: \"VITD3\", \"VD3RIA\"    Lab Results   Component Value Date/Time    TSH 1.750 08/04/2023 12:00 AM

## 2024-04-19 ENCOUNTER — OFFICE VISIT (OUTPATIENT)
Age: 34
End: 2024-04-19
Payer: COMMERCIAL

## 2024-04-19 VITALS
HEIGHT: 62 IN | BODY MASS INDEX: 37.98 KG/M2 | HEART RATE: 78 BPM | OXYGEN SATURATION: 100 % | RESPIRATION RATE: 16 BRPM | TEMPERATURE: 98.9 F | DIASTOLIC BLOOD PRESSURE: 81 MMHG | SYSTOLIC BLOOD PRESSURE: 132 MMHG | WEIGHT: 206.4 LBS

## 2024-04-19 DIAGNOSIS — I10 ESSENTIAL HYPERTENSION: Primary | ICD-10-CM

## 2024-04-19 PROCEDURE — 3079F DIAST BP 80-89 MM HG: CPT | Performed by: NURSE PRACTITIONER

## 2024-04-19 PROCEDURE — 3075F SYST BP GE 130 - 139MM HG: CPT | Performed by: NURSE PRACTITIONER

## 2024-04-19 PROCEDURE — 99213 OFFICE O/P EST LOW 20 MIN: CPT | Performed by: NURSE PRACTITIONER

## 2024-04-19 NOTE — PROGRESS NOTES
Chief Complaint   Patient presents with    Follow-up     \"Have you been to the ER, urgent care clinic since your last visit?  Hospitalized since your last visit?\"    NO    “Have you seen or consulted any other health care providers outside of [facility] since your last visit?”    NO

## 2024-08-11 DIAGNOSIS — F41.1 GAD (GENERALIZED ANXIETY DISORDER): ICD-10-CM

## 2024-08-12 RX ORDER — ESCITALOPRAM OXALATE 10 MG/1
10 TABLET ORAL DAILY
Qty: 30 TABLET | Refills: 3 | Status: SHIPPED | OUTPATIENT
Start: 2024-08-12

## 2024-08-13 DIAGNOSIS — I10 ESSENTIAL HYPERTENSION: ICD-10-CM

## 2024-08-13 NOTE — TELEPHONE ENCOUNTER
PCP: Adrianne Hogan APRN - NP    Last appt: 4/19/2024   Future Appointments   Date Time Provider Department Center   8/21/2024  8:30 AM Adrianne Hogan APRN - NP [facility] DE[facility]       Requested Prescriptions     Pending Prescriptions Disp Refills    lisinopril (PRINIVIL;ZESTRIL) 40 MG tablet 90 tablet 3     Sig: Take 1 tablet by mouth daily    amLODIPine (NORVASC) 5 MG tablet 30 tablet 3     Sig: Take 1 tablet by mouth daily         Prior labs and Blood pressures:  BP Readings from Last 3 Encounters:   04/19/24 132/81   03/15/24 (!) 147/96   02/12/24 (!) 149/98     Lab Results   Component Value Date/Time     08/04/2023 12:00 AM    K 4.2 08/04/2023 12:00 AM     08/04/2023 12:00 AM    CO2 21 08/04/2023 12:00 AM    BUN 12 08/04/2023 12:00 AM     No results found for: \"HBA1C\", \"[id number]\"  Lab Results   Component Value Date/Time    CHOL 235 08/04/2023 12:00 AM    HDL 54 08/04/2023 12:00 AM     08/04/2023 12:00 AM    VLDL 35 08/04/2023 12:00 AM     No results found for: \"VITD3\"    Lab Results   Component Value Date/Time    TSH 1.750 08/04/2023 12:00 AM

## 2024-08-14 RX ORDER — AMLODIPINE BESYLATE 5 MG/1
5 TABLET ORAL DAILY
Qty: 30 TABLET | Refills: 0 | Status: SHIPPED | OUTPATIENT
Start: 2024-08-14

## 2024-08-14 RX ORDER — LISINOPRIL 40 MG/1
40 TABLET ORAL DAILY
Qty: 30 TABLET | Refills: 0 | Status: SHIPPED | OUTPATIENT
Start: 2024-08-14

## 2024-08-21 ENCOUNTER — TELEPHONE (OUTPATIENT)
Age: 34
End: 2024-08-21

## 2024-09-27 RX ORDER — AMLODIPINE BESYLATE 5 MG/1
5 TABLET ORAL DAILY
Qty: 90 TABLET | Refills: 3 | Status: SHIPPED | OUTPATIENT
Start: 2024-09-27

## 2024-10-16 ENCOUNTER — OFFICE VISIT (OUTPATIENT)
Age: 34
End: 2024-10-16
Payer: COMMERCIAL

## 2024-10-16 VITALS
HEART RATE: 82 BPM | DIASTOLIC BLOOD PRESSURE: 80 MMHG | BODY MASS INDEX: 37.65 KG/M2 | RESPIRATION RATE: 16 BRPM | HEIGHT: 62 IN | WEIGHT: 204.6 LBS | SYSTOLIC BLOOD PRESSURE: 134 MMHG | OXYGEN SATURATION: 100 % | TEMPERATURE: 98.5 F

## 2024-10-16 DIAGNOSIS — Z00.00 ROUTINE GENERAL MEDICAL EXAMINATION AT A HEALTH CARE FACILITY: Primary | ICD-10-CM

## 2024-10-16 DIAGNOSIS — E78.2 MIXED HYPERLIPIDEMIA: ICD-10-CM

## 2024-10-16 DIAGNOSIS — Z23 ENCOUNTER FOR IMMUNIZATION: ICD-10-CM

## 2024-10-16 DIAGNOSIS — Z13.1 SCREENING FOR DIABETES MELLITUS: ICD-10-CM

## 2024-10-16 DIAGNOSIS — I10 ESSENTIAL HYPERTENSION: ICD-10-CM

## 2024-10-16 PROCEDURE — 90471 IMMUNIZATION ADMIN: CPT | Performed by: NURSE PRACTITIONER

## 2024-10-16 PROCEDURE — 3075F SYST BP GE 130 - 139MM HG: CPT | Performed by: NURSE PRACTITIONER

## 2024-10-16 PROCEDURE — 90661 CCIIV3 VAC ABX FR 0.5 ML IM: CPT | Performed by: NURSE PRACTITIONER

## 2024-10-16 PROCEDURE — 99395 PREV VISIT EST AGE 18-39: CPT | Performed by: NURSE PRACTITIONER

## 2024-10-16 PROCEDURE — 3078F DIAST BP <80 MM HG: CPT | Performed by: NURSE PRACTITIONER

## 2024-10-16 SDOH — ECONOMIC STABILITY: FOOD INSECURITY: WITHIN THE PAST 12 MONTHS, YOU WORRIED THAT YOUR FOOD WOULD RUN OUT BEFORE YOU GOT MONEY TO BUY MORE.: NEVER TRUE

## 2024-10-16 SDOH — ECONOMIC STABILITY: FOOD INSECURITY: WITHIN THE PAST 12 MONTHS, THE FOOD YOU BOUGHT JUST DIDN'T LAST AND YOU DIDN'T HAVE MONEY TO GET MORE.: NEVER TRUE

## 2024-10-16 SDOH — ECONOMIC STABILITY: INCOME INSECURITY: HOW HARD IS IT FOR YOU TO PAY FOR THE VERY BASICS LIKE FOOD, HOUSING, MEDICAL CARE, AND HEATING?: NOT VERY HARD

## 2024-10-16 NOTE — PROGRESS NOTES
Chief Complaint   Patient presents with    Annual Exam     \"Have you been to the ER, urgent care clinic since your last visit?  Hospitalized since your last visit?\"    NO    “Have you seen or consulted any other health care providers outside of [facility] since your last visit?”    NO            
heard.     No friction rub. No gallop.   Pulmonary:      Effort: Pulmonary effort is normal.      Breath sounds: Normal breath sounds.   Abdominal:      General: Abdomen is flat. There is no distension.      Palpations: Abdomen is soft. There is no mass.      Hernia: No hernia is present.   Musculoskeletal:         General: Normal range of motion.      Cervical back: Normal range of motion.   Skin:     General: Skin is warm and dry.   Neurological:      Mental Status: She is alert. Mental status is at baseline.   Psychiatric:         Mood and Affect: Mood normal.         Behavior: Behavior normal.         Judgment: Judgment normal.

## 2024-10-17 LAB
ALBUMIN SERPL-MCNC: 4.6 G/DL (ref 3.9–4.9)
ALP SERPL-CCNC: 76 IU/L (ref 44–121)
ALT SERPL-CCNC: 21 IU/L (ref 0–32)
AST SERPL-CCNC: 16 IU/L (ref 0–40)
BASOPHILS # BLD AUTO: 0.1 X10E3/UL (ref 0–0.2)
BASOPHILS NFR BLD AUTO: 1 %
BILIRUB SERPL-MCNC: 0.3 MG/DL (ref 0–1.2)
BUN SERPL-MCNC: 13 MG/DL (ref 6–20)
BUN/CREAT SERPL: 16 (ref 9–23)
CALCIUM SERPL-MCNC: 9.6 MG/DL (ref 8.7–10.2)
CHLORIDE SERPL-SCNC: 102 MMOL/L (ref 96–106)
CHOLEST SERPL-MCNC: 232 MG/DL (ref 100–199)
CO2 SERPL-SCNC: 23 MMOL/L (ref 20–29)
CREAT SERPL-MCNC: 0.81 MG/DL (ref 0.57–1)
EGFRCR SERPLBLD CKD-EPI 2021: 98 ML/MIN/1.73
EOSINOPHIL # BLD AUTO: 0.2 X10E3/UL (ref 0–0.4)
EOSINOPHIL NFR BLD AUTO: 3 %
ERYTHROCYTE [DISTWIDTH] IN BLOOD BY AUTOMATED COUNT: 13.1 % (ref 11.7–15.4)
GLOBULIN SER CALC-MCNC: 2.5 G/DL (ref 1.5–4.5)
GLUCOSE SERPL-MCNC: 91 MG/DL (ref 70–99)
HBA1C MFR BLD: 6 % (ref 4.8–5.6)
HCT VFR BLD AUTO: 46 % (ref 34–46.6)
HDLC SERPL-MCNC: 41 MG/DL
HGB BLD-MCNC: 15.1 G/DL (ref 11.1–15.9)
IMM GRANULOCYTES # BLD AUTO: 0 X10E3/UL (ref 0–0.1)
IMM GRANULOCYTES NFR BLD AUTO: 0 %
IMP & REVIEW OF LAB RESULTS: NORMAL
LDLC SERPL CALC-MCNC: 163 MG/DL (ref 0–99)
LYMPHOCYTES # BLD AUTO: 2.6 X10E3/UL (ref 0.7–3.1)
LYMPHOCYTES NFR BLD AUTO: 38 %
MCH RBC QN AUTO: 30.8 PG (ref 26.6–33)
MCHC RBC AUTO-ENTMCNC: 32.8 G/DL (ref 31.5–35.7)
MCV RBC AUTO: 94 FL (ref 79–97)
MONOCYTES # BLD AUTO: 0.4 X10E3/UL (ref 0.1–0.9)
MONOCYTES NFR BLD AUTO: 5 %
NEUTROPHILS # BLD AUTO: 3.7 X10E3/UL (ref 1.4–7)
NEUTROPHILS NFR BLD AUTO: 53 %
PLATELET # BLD AUTO: 296 X10E3/UL (ref 150–450)
POTASSIUM SERPL-SCNC: 4.5 MMOL/L (ref 3.5–5.2)
PROT SERPL-MCNC: 7.1 G/DL (ref 6–8.5)
RBC # BLD AUTO: 4.9 X10E6/UL (ref 3.77–5.28)
SODIUM SERPL-SCNC: 139 MMOL/L (ref 134–144)
TRIGL SERPL-MCNC: 154 MG/DL (ref 0–149)
VLDLC SERPL CALC-MCNC: 28 MG/DL (ref 5–40)
WBC # BLD AUTO: 7 X10E3/UL (ref 3.4–10.8)

## 2024-10-21 ENCOUNTER — PATIENT MESSAGE (OUTPATIENT)
Age: 34
End: 2024-10-21

## 2024-10-22 RX ORDER — METFORMIN HYDROCHLORIDE 500 MG/1
500 TABLET, EXTENDED RELEASE ORAL
Qty: 30 TABLET | Refills: 0 | Status: SHIPPED | OUTPATIENT
Start: 2024-10-22

## 2024-11-19 NOTE — TELEPHONE ENCOUNTER
PCP: Adrianne Hogan APRN - NP    Last Appt:10/16/2024    Future Appointments   Date Time Provider Department Center   4/16/2025  8:00 AM Adrianne Hogan APRN - NP [facility] DEP       Requested Prescriptions     Pending Prescriptions Disp Refills    metFORMIN (GLUCOPHAGE-XR) 500 MG extended release tablet 30 tablet 0     Sig: Take 1 tablet by mouth daily (with breakfast)       Prior labs and Blood pressures:  BP Readings from Last 3 Encounters:   10/16/24 134/80   04/19/24 132/81   03/15/24 (!) 147/96     Lab Results   Component Value Date/Time     10/16/2024 12:00 AM    K 4.5 10/16/2024 12:00 AM     10/16/2024 12:00 AM    CO2 23 10/16/2024 12:00 AM    BUN 13 10/16/2024 12:00 AM     No results found for: \"HBA1C\", \"[id number]\"  Lab Results   Component Value Date/Time    CHOL 232 10/16/2024 12:00 AM    HDL 41 10/16/2024 12:00 AM     10/16/2024 12:00 AM     08/04/2023 12:00 AM    VLDL 28 10/16/2024 12:00 AM     No results found for: \"VITD3\"    Lab Results   Component Value Date/Time    TSH 1.750 08/04/2023 12:00 AM

## 2024-11-21 RX ORDER — METFORMIN HYDROCHLORIDE 500 MG/1
500 TABLET, EXTENDED RELEASE ORAL
Qty: 90 TABLET | Refills: 1 | Status: SHIPPED | OUTPATIENT
Start: 2024-11-21

## 2024-12-16 DIAGNOSIS — F41.1 GAD (GENERALIZED ANXIETY DISORDER): ICD-10-CM

## 2024-12-16 NOTE — TELEPHONE ENCOUNTER
PCP: Adrianne Hogan APRN - NP    Last appt: 10/16/2024     Future Appointments   Date Time Provider Department Center   4/16/2025  8:00 AM Adrianne Hogan APRN - NP [facility] DEP       Requested Prescriptions     Pending Prescriptions Disp Refills    escitalopram (LEXAPRO) 10 MG tablet 30 tablet 3     Sig: Take 1 tablet by mouth daily         Prior labs and Blood pressures:  BP Readings from Last 3 Encounters:   10/16/24 134/80   04/19/24 132/81   03/15/24 (!) 147/96     Lab Results   Component Value Date/Time     10/16/2024 12:00 AM    K 4.5 10/16/2024 12:00 AM     10/16/2024 12:00 AM    CO2 23 10/16/2024 12:00 AM    BUN 13 10/16/2024 12:00 AM     Lab Results   Component Value Date/Time    CHOL 232 10/16/2024 12:00 AM    HDL 41 10/16/2024 12:00 AM     10/16/2024 12:00 AM     08/04/2023 12:00 AM    VLDL 28 10/16/2024 12:00 AM     Lab Results   Component Value Date/Time    TSH 1.750 08/04/2023 12:00 AM

## 2024-12-17 RX ORDER — ESCITALOPRAM OXALATE 10 MG/1
10 TABLET ORAL DAILY
Qty: 30 TABLET | Refills: 3 | Status: SHIPPED | OUTPATIENT
Start: 2024-12-17

## 2025-03-26 ENCOUNTER — TELEPHONE (OUTPATIENT)
Age: 35
End: 2025-03-26

## 2025-03-26 NOTE — TELEPHONE ENCOUNTER
Called pt to make her aware that [facility] is out of network with [facility]. Pt wants to keep her appt and if she wants to cancel she will call back. Thank you

## 2025-03-28 DIAGNOSIS — F41.1 GAD (GENERALIZED ANXIETY DISORDER): ICD-10-CM

## 2025-03-28 DIAGNOSIS — I10 ESSENTIAL HYPERTENSION: ICD-10-CM

## 2025-03-28 RX ORDER — METFORMIN HYDROCHLORIDE 500 MG/1
500 TABLET, EXTENDED RELEASE ORAL
Qty: 90 TABLET | Refills: 3 | Status: SHIPPED | OUTPATIENT
Start: 2025-03-28

## 2025-03-28 RX ORDER — LISINOPRIL 40 MG/1
40 TABLET ORAL DAILY
Qty: 90 TABLET | Refills: 3 | Status: SHIPPED | OUTPATIENT
Start: 2025-03-28

## 2025-03-28 RX ORDER — AMLODIPINE BESYLATE 5 MG/1
5 TABLET ORAL DAILY
Qty: 90 TABLET | Refills: 3 | Status: SHIPPED | OUTPATIENT
Start: 2025-03-28

## 2025-03-28 RX ORDER — LISINOPRIL 20 MG/1
TABLET ORAL
Refills: 0 | OUTPATIENT
Start: 2025-03-28

## 2025-03-28 RX ORDER — ESCITALOPRAM OXALATE 10 MG/1
10 TABLET ORAL DAILY
Qty: 90 TABLET | Refills: 3 | Status: SHIPPED | OUTPATIENT
Start: 2025-03-28

## 2025-03-28 NOTE — TELEPHONE ENCOUNTER
PCP: Adrianne Hogan APRN - NP    Last appt: 10/16/2024   Future Appointments   Date Time Provider Department Center   4/16/2025  8:00 AM Adrianne Hogan APRN - NP [facility]       Requested Prescriptions     Pending Prescriptions Disp Refills    amLODIPine (NORVASC) 5 MG tablet [Pharmacy Med Name: AMLODIPINE BESYLATE TABS 5MG]  0    escitalopram (LEXAPRO) 10 MG tablet [Pharmacy Med Name: ESCITALOPRAM TABS 10MG]  0    lisinopril (PRINIVIL;ZESTRIL) 40 MG tablet [Pharmacy Med Name: LISINOPRIL TABS 40MG]  0    lisinopril (PRINIVIL;ZESTRIL) 20 MG tablet [Pharmacy Med Name: LISINOPRIL TABS 20MG]  0    metFORMIN (GLUCOPHAGE-XR) 500 MG extended release tablet [Pharmacy Med Name: METFORMIN HCL ER TABS 500MG]  0

## 2025-04-21 RX ORDER — LISINOPRIL 20 MG/1
TABLET ORAL
Refills: 0 | OUTPATIENT
Start: 2025-04-21